# Patient Record
Sex: FEMALE | Race: BLACK OR AFRICAN AMERICAN | NOT HISPANIC OR LATINO
[De-identification: names, ages, dates, MRNs, and addresses within clinical notes are randomized per-mention and may not be internally consistent; named-entity substitution may affect disease eponyms.]

---

## 2021-10-29 ENCOUNTER — APPOINTMENT (OUTPATIENT)
Dept: DISASTER EMERGENCY | Facility: CLINIC | Age: 76
End: 2021-10-29

## 2021-10-29 LAB — SARS-COV-2 N GENE NPH QL NAA+PROBE: NOT DETECTED

## 2021-11-06 DIAGNOSIS — Z01.818 ENCOUNTER FOR OTHER PREPROCEDURAL EXAMINATION: ICD-10-CM

## 2021-11-12 ENCOUNTER — APPOINTMENT (OUTPATIENT)
Dept: DISASTER EMERGENCY | Facility: CLINIC | Age: 76
End: 2021-11-12

## 2021-11-13 LAB — SARS-COV-2 N GENE NPH QL NAA+PROBE: NOT DETECTED

## 2022-04-16 ENCOUNTER — LABORATORY RESULT (OUTPATIENT)
Age: 77
End: 2022-04-16

## 2022-04-20 ENCOUNTER — APPOINTMENT (OUTPATIENT)
Dept: PAIN MANAGEMENT | Facility: CLINIC | Age: 77
End: 2022-04-20
Payer: MEDICARE

## 2022-04-20 PROCEDURE — 64494 INJ PARAVERT F JNT L/S 2 LEV: CPT | Mod: 59,LT

## 2022-04-20 PROCEDURE — 64493 INJ PARAVERT F JNT L/S 1 LEV: CPT | Mod: RT

## 2022-04-20 NOTE — PROCEDURE
[FreeTextEntry3] : Date of Service: 04/20/2022 \par \par Account: [id number]\par \par Patient: OLU FISHMAN \par \par YOB: 1945\par \par Age: 76 year\par \par \par Surgeon:  John Greene M.D.\par \par Assistant: None.\par \par Pre-Operative Diagnosis: Spondylosis of lumbar region without myelopathy or radiculopathy\par \par Post Operative Diagnosis:  Spondylosis of lumbar region without myelopathy or radiculopathy\par \par Procedure: Right L3,L4,L5 Medial Branch block \par                    Left L3,L4,L5  Medial Branch block under fluoroscopic guidance\par \par Anesthesia:      MAC\par \par This procedure was carried out using fluoroscopic guidance.  The risks and benefits of the procedure were discussed extensively with the patient.  The consent of the patient was obtained and the following procedure was performed.\par \par  The patient was placed in the prone position.  The patient's back was prepped and draped in a sterile fashion.  The left L4 and L5 lumbar vertebral bodies were identified and the fluoroscope left obliqued to approximately 30 degrees to reveal good "Charles-dog" anatomical view.  The junction of the superior articulate process and tranverse process at the L4 and L5 level was then identified and marked. The skin at these target points was then localized using 1 cc of 1% Lidocaine without epinephrine at each injection site.  A spinal needle was then introduced and advanced to the above target points at the junction of the SAP and transverse processes until oss was contacted.  After negative aspiration for heme and CSF, an injectate of 1cc 0.25% marcaine  was injected at each of the two levels. \par \par The right L4 and L5 lumbar vertebral bodies were identified and the fluoroscope right obliqued to approximately 30 degrees to reveal good "Charles-dog" anatomical view.  The junction of the superior articulate process and tranverse process at the L4 and L5 level was then identified and marked. The skin at these target points was then localized using 1 cc of 1% Lidocaine without epinephrine at each injection site.  A spinal needle was then introduced and advanced to the above target points at the junction of the SAP and transverse processes until oss was contacted.  After negative aspiration for heme and CSF, an injectate of 1cc 0.25% was injected at each of the two levels. \par \par  Fluoroscope then focused on the bilateral sacral ala on AP view, and marked at these points.  The skin and subcutaneous structures were localized using 1cc of 1.0 % lidocaine without epinephrine.  A spinal needle was then advanced under fluoroscopic guidance until oss was contacted at the ala bilaterally.  After negative aspiration for heme and CSF, an injectate of 1cc 0.25% marcaine was injected at each site.\par \par The needles were then removed and pressure was applied.  Anesthesia personnel were present throughout the procedure, monitoring vitals which were stable throughout.\par \par \par John Greene M.D.\par

## 2022-04-26 ENCOUNTER — APPOINTMENT (OUTPATIENT)
Dept: ORTHOPEDIC SURGERY | Facility: CLINIC | Age: 77
End: 2022-04-26
Payer: MEDICARE

## 2022-04-26 VITALS — HEIGHT: 63 IN | WEIGHT: 160 LBS | BODY MASS INDEX: 28.35 KG/M2

## 2022-04-26 DIAGNOSIS — I10 ESSENTIAL (PRIMARY) HYPERTENSION: ICD-10-CM

## 2022-04-26 PROCEDURE — 99214 OFFICE O/P EST MOD 30 MIN: CPT

## 2022-04-26 NOTE — HISTORY OF PRESENT ILLNESS
[7] : 7 [Dull/Aching] : dull/aching [Localized] : localized [Lying in bed] : lying in bed [Retired] : Work status: retired [de-identified] : 77 y/o female with bilateral knee pain left > right. Seen in urgent care with MRI left knee. and lumbar spine. Was seen by Dr treadwell for back. Here to review MRI knee [] : Post Surgical Visit: no [FreeTextEntry1] : left knee  [FreeTextEntry3] : 3/10/22 [FreeTextEntry5] : patient is here with knee pain since 3/10/22\par patient is here for MRI results

## 2022-04-26 NOTE — ASSESSMENT
[FreeTextEntry1] :  76 year F WITH MODERATE RT/LT KNEE PAIN FOR MANY YEARS. SHE HAS TRIED GEL SHOTS WITH MINIMAL RELIEF. PAIN WORSENS WITH STAIRS AND WALKING PROLONGED DISTANCES. MRI REVIEWED. PAIN IS AFFECTING ADL AND FUNCTIONAL ACTIVITIES. TREATMENT OPTIONS REVIEWED. SHE WILL CHECK A1C BEFORE CSI IS CONSIDERED. IF A1C IS BELOW 7 SHE CAN RETURN FOR CSI AND LT KNEE XRAY. WILL AUTH FOR LT KNEE EUFLEXXA. \par \par \par PMHX: DM A1C: ?

## 2022-04-28 ENCOUNTER — APPOINTMENT (OUTPATIENT)
Dept: ORTHOPEDIC SURGERY | Facility: CLINIC | Age: 77
End: 2022-04-28
Payer: MEDICARE

## 2022-04-28 VITALS — HEIGHT: 63 IN | WEIGHT: 160 LBS | BODY MASS INDEX: 28.35 KG/M2

## 2022-04-28 DIAGNOSIS — M24.151 OTHER ARTICULAR CARTILAGE DISORDERS, RIGHT HIP: ICD-10-CM

## 2022-04-28 DIAGNOSIS — S83.282D OTHER TEAR OF LATERAL MENISCUS, CURRENT INJURY, LEFT KNEE, SUBSEQUENT ENCOUNTER: ICD-10-CM

## 2022-04-28 DIAGNOSIS — M16.11 UNILATERAL PRIMARY OSTEOARTHRITIS, RIGHT HIP: ICD-10-CM

## 2022-04-28 DIAGNOSIS — S83.232D COMPLEX TEAR OF MEDIAL MENISCUS, CURRENT INJURY, LEFT KNEE, SUBSEQUENT ENCOUNTER: ICD-10-CM

## 2022-04-28 PROCEDURE — 99214 OFFICE O/P EST MOD 30 MIN: CPT | Mod: 25

## 2022-04-28 PROCEDURE — 20610 DRAIN/INJ JOINT/BURSA W/O US: CPT

## 2022-04-28 RX ORDER — METFORMIN HYDROCHLORIDE 1000 MG/1
1000 TABLET, COATED ORAL
Refills: 0 | Status: ACTIVE | COMMUNITY

## 2022-04-28 RX ORDER — LISINOPRIL 40 MG/1
40 TABLET ORAL
Refills: 0 | Status: ACTIVE | COMMUNITY

## 2022-04-28 NOTE — PHYSICAL EXAM
[Extension] : extension [Bending to left] : bending to left [Right] : right hip [Left] : left knee [NL (0)] : extension 0 degrees [5___] : hamstring 5[unfilled]/5 [] : no erythema [TWNoteComboBox7] : flexion 120 degrees

## 2022-04-28 NOTE — PROCEDURE
[FreeTextEntry3] : Large Joint Injection was performed because of pain and inflammation. Anesthesia: ethyl chloride sprayed topically.. \par Decadron: An injection of Decadron 4 mg , \par Kenalog: An injection of Kenalog 5 mg , \par Lidocaine: 2 cc. \par \par Medication was injected in the left knee. Patient has tried OTC's including aspirin, Ibuprofen, Aleve etc or prescription NSAIDS, and/or exercises at home and/ or physical therapy without satisfactory response and Patient has decreased mobility in the joint. After verbal consent using sterile preparation and technique. The risks, benefits, and alternatives to cortisone injection were explained in full to the patient. Risks outlined include but are not limited to infection, sepsis, bleeding, scarring, skin discoloration, temporary increase in pain, syncopal episode, failure to resolve symptoms, allergic reaction, symptom recurrence, and elevation of blood sugar in diabetics. Patient understood the risks. All questions were answered. After discussion of options, patient requested an injection. Oral informed consent was obtained and sterile prep was done of the injection site. Sterile technique was utilized for the procedure including the preparation of the solutions used for the injection. Patient tolerated the procedure well. Advised to ice the injection site this evening. Prep with betadine locally to site. Sterile technique used and Prep with alcohol locally to site. Sterile technique used. Patient tolerated procedure well. Post Procedure Instructions: Patient was advised to call if redness, pain, or fever occur and apply ice for 15 min. out of every hour for the next 12-24 hours as tolerated. patient was advised to rest the joint(s) for 1-2 days.\par \par

## 2022-04-28 NOTE — DATA REVIEWED
[FreeTextEntry1] : MRI of the lumbar spine was reviewed and independently interpreted by me:\par No interval progression when compared to prior examination.\par L3-4 left foraminal herniation abutting the left exiting L3 nerve root. Moderate left neural foraminal stenosis.\par L4-5 grade 1 anterolisthesis, disc bulge and facet hypertrophy resulting in mild central canal, moderate bilateral neural foraminal stenosis, stable.\par L5-S1 central disc herniation with cranial migration. No thecal sac impingement. Facet hypertrophy contributes to moderate bilateral neural foraminal stenosis.\par \par MRI of the left knee was reviewed and independently interpreted by me:\par 1.  Complex tear of the posterior horn extending the posterior horn/body junction of the medial meniscus with associated parameniscal cysts, progressed compared to prior.\par 2.  Horizontal tear of the body of the lateral meniscus, progressed compared to prior.\par 3.  Low-grade sprain of the medial collateral ligament.\par 4.  Mild to moderate medial and mild patellofemoral osteoarthrosis, similar compared to prior.\par 5.  Multilobulated large popliteal cyst, slightly increased in size compared to prior.\par 6.  Mild distal quadriceps tendinosis.\par \par MRI of the right hip was reviewed and independently interpreted by me:\par 1.  Mild right hip osteoarthrosis. Small joint effusion.\par 2.  Degenerative tear of the superior labrum extending to the anterior labrum.\par 3.  4.5 cm subcutaneous lesion the left pubic region, incompletely evaluated, but may represent a lipoma; recommend clinical correlation and targeted ultrasound and/or MRI pelvic wall for further evaluation.\par 4.  Fibroid uterus.\par

## 2022-04-28 NOTE — DISCUSSION/SUMMARY
[de-identified] : MRIs reviewed with patient.\par Questions answered.\par \par The risks, benefits, and alternatives to corticosteroid injection were reviewed with the patient. Risks outlined include, but are not limited to infection, sepsis, bleeding, scarring, skin discoloration, temporary increase in pain, syncopal episode, failure to resolve symptoms, symptoms recurrence, allergic reaction, flare reaction, and elevation of blood sugar in diabetics. Patient understood the risks and asked to proceed with this treatment course.\par   HNP advised [place] and epidurals  and to see Spine surgeon.  Knee to consider visco and cortisone today. extended discussion that knee arthroscopy miight help. cortisone today . assures us that her BS is less than 150. explained if higher she may need ER for increased Blood sugar. Hip advised [place] and cortisone injection,. all explained

## 2022-04-28 NOTE — HISTORY OF PRESENT ILLNESS
[7] : 7 [Constant] : constant [] : yes [de-identified] : Follow-up today. Symptoms continue. She saw Dr. Rebolledo, who recommended CSI (pending patient's recent HgA1c) and visco. Also had follow up with Dr. Greene, who recommended facet blocks. Here to review multiple MRIs. Has seen Amaury who recc a hip injections. Knee was advised euflexa. back was advised Jc  and epidurals [FreeTextEntry1] : left knee [de-identified] : MRI @ [facility]

## 2022-04-28 NOTE — REASON FOR VISIT
[FreeTextEntry2] : Patient is here for a Follow Up to review MRI results for the left knee.. pain left knee lower back and hip persists.

## 2022-04-29 ENCOUNTER — APPOINTMENT (OUTPATIENT)
Dept: ORTHOPEDIC SURGERY | Facility: CLINIC | Age: 77
End: 2022-04-29

## 2022-05-26 ENCOUNTER — APPOINTMENT (OUTPATIENT)
Dept: PAIN MANAGEMENT | Facility: CLINIC | Age: 77
End: 2022-05-26

## 2022-06-16 ENCOUNTER — APPOINTMENT (OUTPATIENT)
Dept: PAIN MANAGEMENT | Facility: CLINIC | Age: 77
End: 2022-06-16

## 2022-06-16 NOTE — REASON FOR VISIT
[FreeTextEntry2] : f/u to injection\par Procedure: Right L3,L4,L5 medial branch block and Left L3,L4,L5 medial branch block under fluoroscopic guidance \par Anesthesia:MAC(DOS: 04/20/2022)

## 2022-07-06 ENCOUNTER — APPOINTMENT (OUTPATIENT)
Dept: ORTHOPEDIC SURGERY | Facility: CLINIC | Age: 77
End: 2022-07-06

## 2022-07-06 VITALS — BODY MASS INDEX: 28.35 KG/M2 | WEIGHT: 160 LBS | HEIGHT: 63 IN

## 2022-07-06 DIAGNOSIS — M89.8X8 OTHER SPECIFIED DISORDERS OF BONE, OTHER SITE: ICD-10-CM

## 2022-07-06 DIAGNOSIS — M76.899 OTHER SPECIFIED ENTHESOPATHIES OF UNSPECIFIED LOWER LIMB, EXCLUDING FOOT: ICD-10-CM

## 2022-07-06 DIAGNOSIS — M70.62 TROCHANTERIC BURSITIS, LEFT HIP: ICD-10-CM

## 2022-07-06 DIAGNOSIS — M25.552 PAIN IN LEFT HIP: ICD-10-CM

## 2022-07-06 PROCEDURE — 99214 OFFICE O/P EST MOD 30 MIN: CPT

## 2022-07-06 PROCEDURE — 73502 X-RAY EXAM HIP UNI 2-3 VIEWS: CPT | Mod: LT

## 2022-07-06 NOTE — DISCUSSION/SUMMARY
[de-identified] : Progress note completed by BETTY Lancaster.\par \par Physician Instructions:\par The documentation recorded by the PA accurately reflects the service I personally performed and decisions made by me.\par \par discussed may need mri and to start PT if not improving in several weels\par \par Patient instructed to apply ice to the area of the injection for 10 to 15 minutes 3 x a day if has symptoms\par \par

## 2022-07-06 NOTE — ASSESSMENT
[FreeTextEntry1] : The patient was advised of the diagnosis. The natural history of the pathology was explained in full to the patient in layman's terms. All questions were answered. The risks and benefits of surgical and non-surgical treatment alternatives were explained in full to the patient.\par \par \par

## 2022-07-06 NOTE — PHYSICAL EXAM
[Extension] : extension [Bending to left] : bending to left [Left] : left knee [NL (0)] : extension 0 degrees [5___] : hamstring 5[unfilled]/5 [] : no erythema [TWNoteComboBox7] : flexion 120 degrees

## 2022-07-06 NOTE — IMAGING
[Left] : left hip with pelvis [AP] : anteroposterior [Lateral] : lateral [There are no fractures, subluxations or dislocations. No significant abnormalities are seen] : There are no fractures, subluxations or dislocations. No significant abnormalities are seen [FreeTextEntry9] : noted fibroids

## 2022-07-06 NOTE — HISTORY OF PRESENT ILLNESS
[Gradual] : gradual [Result of repetitive motion] : result of repetitive motion [8] : 8 [0] : 0 [de-identified] : Patient presents with left hip pain since 5/2022. No specific injury, but had been on her feet for two days. Describes lateral hip pain without radiation. ankle swelling from salt intake [] : no [FreeTextEntry1] : both feet [FreeTextEntry5] : No known injury, pain started occurring gradually

## 2022-07-14 ENCOUNTER — APPOINTMENT (OUTPATIENT)
Dept: PAIN MANAGEMENT | Facility: CLINIC | Age: 77
End: 2022-07-14

## 2022-08-04 ENCOUNTER — APPOINTMENT (OUTPATIENT)
Dept: PAIN MANAGEMENT | Facility: CLINIC | Age: 77
End: 2022-08-04

## 2022-08-04 VITALS — HEIGHT: 63 IN | WEIGHT: 160 LBS | BODY MASS INDEX: 28.35 KG/M2

## 2022-08-04 PROCEDURE — 99214 OFFICE O/P EST MOD 30 MIN: CPT

## 2022-08-04 NOTE — HISTORY OF PRESENT ILLNESS
[Lower back] : lower back [3] : 3 [2] : 2 [Dull/Aching] : dull/aching [Intermittent] : intermittent [Household chores] : household chores [Leisure] : leisure [Injection therapy] : injection therapy [Standing] : standing [Walking] : walking [] : no

## 2022-08-04 NOTE — REASON FOR VISIT
[FreeTextEntry2] : f/u to injection \par Procedure: Right L3,L4,L5 medial branch block and Left L3,L4,L5 medial branch block under fluoroscopic guidance \par Anesthesia:MAC(04/20/2022)

## 2022-08-04 NOTE — PHYSICAL EXAM
[de-identified] : PHYSICAL EXAM\par \par Constitutional: \par Appears well, no apparent distress\par Ability to communicate: Normal\par Respiratory: non-labored breathing\par Skin: no rash noted\par Head: normocephalic, atraumatic\par Neck: no visible thyroid enlargement\par Eyes: extraocular movements intact\par Neurologic: alert and oriented x3\par Psychiatric: normal mood, affect, and behavior\par \par Lumbar Spine: \par Palpation: left and right lumbar paraspinal spasm and left and right lumbar paraspinal tenderness to palpation.\par ROM: Diminished range of motion in all plains.  Patient notes pain with lateral bending to the left and right.\par MMT: Motor exam is 5/5 through out bilateral lower extremities.\par Sensation: Light touch and pain is intact throughout bilateral lower extremities.\par Reflexes: achilles and patella reflexes are intact and  symmetrical.  No sustained clonus.\par Special Testing: Positive kemps maneuver on the left and right side\par \par Assessemnt:\par Lumbar spondylosis (m47.816)\par Myalgia (M79.10)\par \par Plan:\par After discussing various treatment options with the patient including but not limited to oral medications, physical therapy, exercise modalities as well as interventional spinal injections, we have decided with the following plan:\par The patient is presenting with axial lumbar pain that has not responded to three months of conservative therapy including physical therapy or nsaid therapy. The pain is interfering with activities of daily living and functionality.  There is no radicular pain.  The pain is exacerbated by facet loading.  Positive kemps maneuver which is defined by pain reproduction with extension and rotation of the lumbar spine to the affected side.  The patient has not had a vertebral fusion at the levels of the proposed treatment.  There is no unexplained neurologic deficit.  There is no bleeding tendency, unstable medical condition, or systemic infection.  The injection is being performed to diagnose the facet joint as the source of the individuals pain.\par \par The risks, benefits and alternatives of the proposed procedure were explained in detail with the patient.  The risks outlined include but are not limited to infection, bleeding, post dural puncture headache, nerve injury, a temporary increase in pain, failure to resolve symptoms, allergic reaction, symptom recurrence, and possible elevation of blood sugar.  All questions were answered to patient's satisfaction and he/she verbalized an understanding.\par \par Follow up 1-2 weeks post injection foe re-evaluation.\par \par Continue home exercises, stretching, activity modification, physical therapy, and conservative care.\par \par \par \par

## 2022-10-19 ENCOUNTER — APPOINTMENT (OUTPATIENT)
Dept: ORTHOPEDIC SURGERY | Facility: CLINIC | Age: 77
End: 2022-10-19

## 2022-10-19 VITALS — WEIGHT: 160 LBS | BODY MASS INDEX: 28.35 KG/M2 | HEIGHT: 63 IN

## 2022-10-19 DIAGNOSIS — M51.26 OTHER INTERVERTEBRAL DISC DISPLACEMENT, LUMBAR REGION: ICD-10-CM

## 2022-10-19 DIAGNOSIS — M54.16 RADICULOPATHY, LUMBAR REGION: ICD-10-CM

## 2022-10-19 PROCEDURE — 73565 X-RAY EXAM OF KNEES: CPT

## 2022-10-19 PROCEDURE — 99213 OFFICE O/P EST LOW 20 MIN: CPT | Mod: 25

## 2022-10-19 PROCEDURE — 73560 X-RAY EXAM OF KNEE 1 OR 2: CPT | Mod: LT

## 2022-10-19 PROCEDURE — 20610 DRAIN/INJ JOINT/BURSA W/O US: CPT

## 2022-10-19 NOTE — IMAGING
[Left] : left knee [Lateral] : lateral [[place]] : [place] [Mild patellofemoral OA] : Mild patellofemoral OA [AP Standing] : anteroposterior standing [Moderate tricompartmental OA medial narrowing] : Moderate tricompartmental OA medial narrowing

## 2022-10-26 NOTE — REASON FOR VISIT
[FreeTextEntry2] : Patient is here for a Follow Up appointment for the Left Knee and the Lower Back.

## 2022-10-26 NOTE — HISTORY OF PRESENT ILLNESS
[Lower back] : lower back [7] : 7 [6] : 6 [de-identified] : pain back and left knee. Left knee sore and synvisc 2021 helpped some but more relief from injection of euflexa 2020. No new injuries Back is sore and is having PT that is helpping\par \par MRI march 2022 show HNP and arthritis lumbar spine and foraminal stenosis [FreeTextEntry1] : left knee

## 2022-10-26 NOTE — PROCEDURE
[FreeTextEntry3] : Large Joint Injection was performed in the left knee because of pain and inflammation. \par Decadron: An injection of Decadron 4 mg , \par Kenalog: An injection of Kenalog 5 mg , \par Lidocaine: 2 cc. \par \par Patient has tried OTC's including aspirin, Ibuprofen, Aleve etc or prescription NSAIDS, and/or exercises at home and/ or physical therapy without satisfactory response and Patient has decreased mobility in the joint. The risks, benefits, and alternatives to cortisone injection were explained in full to the patient. Risks outlined include but are not limited to infection, sepsis, bleeding, scarring, skin discoloration, temporary increase in pain, syncopal episode, failure to resolve symptoms, allergic reaction, symptom recurrence, and elevation of blood sugar in diabetics. Patient understood the risks. All questions were answered. After discussion of options, patient requested an injection. Oral informed consent was obtained. Sterile technique was utilized for the procedure including the preparation of the solutions used for the injection. Injection site was prepped with betadine and alcohol. Ethyl chloride sprayed topically. Sterile technique used. Patient tolerated procedure well. \par \par Post Procedure Instructions: Patient was advised to call if redness, pain, or fever occur. Apply ice for 15 min. every 2 hours for the next 12-24 hours as tolerated. Patient was advised to rest the joint(s) for 1-2 days.\par \par \par

## 2022-10-26 NOTE — DISCUSSION/SUMMARY
[de-identified] : The patient was advised of the diagnosis. The natural history of the pathology was explained in full to the patient in layman's terms. All questions were answered. The risks and benefits of surgical and non-surgical treatment alternatives were explained in full to the patient.\par \par Request suth euflexa X3 left knee. \par \par To continue PT for back and knee. \par \par Total knee in future discussed , also discussed cortisone today

## 2022-11-16 ENCOUNTER — APPOINTMENT (OUTPATIENT)
Dept: PAIN MANAGEMENT | Facility: CLINIC | Age: 77
End: 2022-11-16
Payer: MEDICARE

## 2022-11-16 PROCEDURE — 64494 INJ PARAVERT F JNT L/S 2 LEV: CPT | Mod: 50

## 2022-11-16 PROCEDURE — 64493 INJ PARAVERT F JNT L/S 1 LEV: CPT | Mod: 50

## 2022-11-16 NOTE — PROCEDURE
[FreeTextEntry3] : Date of Service: 11/16/2022 \par \par Account: [id number]\par \par Patient: OLU FISHMAN \par \par YOB: 1945\par \par Age: 77 year\par \par \par Surgeon:  John Greene M.D.\par \par Assistant: None.\par \par Pre-Operative Diagnosis: Spondylosis of lumbar region without myelopathy or radiculopathy\par \par Post Operative Diagnosis:  Spondylosis of lumbar region without myelopathy or radiculopathy\par \par Procedure: Right L3,L4,L5 Medial Branch block \par                    Left L3,L4,L5  Medial Branch block under fluoroscopic guidance\par \par Anesthesia:      MAC\par \par This procedure was carried out using fluoroscopic guidance.  The risks and benefits of the procedure were discussed extensively with the patient.  The consent of the patient was obtained and the following procedure was performed.\par \par  The patient was placed in the prone position.  The patient's back was prepped and draped in a sterile fashion.  The left L4 and L5 lumbar vertebral bodies were identified and the fluoroscope left obliqued to approximately 30 degrees to reveal good "Charles-dog" anatomical view.  The junction of the superior articulate process and tranverse process at the L4 and L5 level was then identified and marked. The skin at these target points was then localized using 1 cc of 1% Lidocaine without epinephrine at each injection site.  A spinal needle was then introduced and advanced to the above target points at the junction of the SAP and transverse processes until oss was contacted.  After negative aspiration for heme and CSF, an injectate of 1cc 0.25% marcaine  was injected at each of the two levels. \par \par The right L4 and L5 lumbar vertebral bodies were identified and the fluoroscope right obliqued to approximately 30 degrees to reveal good "Charles-dog" anatomical view.  The junction of the superior articulate process and tranverse process at the L4 and L5 level was then identified and marked. The skin at these target points was then localized using 1 cc of 1% Lidocaine without epinephrine at each injection site.  A spinal needle was then introduced and advanced to the above target points at the junction of the SAP and transverse processes until oss was contacted.  After negative aspiration for heme and CSF, an injectate of 1cc 0.25% was injected at each of the two levels. \par \par  Fluoroscope then focused on the bilateral sacral ala on AP view, and marked at these points.  The skin and subcutaneous structures were localized using 1cc of 1.0 % lidocaine without epinephrine.  A spinal needle was then advanced under fluoroscopic guidance until oss was contacted at the ala bilaterally.  After negative aspiration for heme and CSF, an injectate of 1cc 0.25% marcaine was injected at each site.\par \par The needles were then removed and pressure was applied.  Anesthesia personnel were present throughout the procedure, monitoring vitals which were stable throughout.\par \par \par John Greene M.D.\par

## 2022-12-08 ENCOUNTER — APPOINTMENT (OUTPATIENT)
Dept: PAIN MANAGEMENT | Facility: CLINIC | Age: 77
End: 2022-12-08

## 2022-12-08 VITALS — BODY MASS INDEX: 28.35 KG/M2 | HEIGHT: 63 IN | WEIGHT: 160 LBS

## 2022-12-08 DIAGNOSIS — M47.817 SPONDYLOSIS W/OUT MYELOPATHY OR RADICULOPATHY, LUMBOSACRAL REGION: ICD-10-CM

## 2022-12-08 PROCEDURE — 99213 OFFICE O/P EST LOW 20 MIN: CPT

## 2022-12-08 NOTE — PHYSICAL EXAM
[de-identified] : PHYSICAL EXAM\par \par Constitutional: \par Appears well, no apparent distress\par Ability to communicate: Normal\par Respiratory: non-labored breathing\par Skin: no rash noted\par Head: normocephalic, atraumatic\par Neck: no visible thyroid enlargement\par Eyes: extraocular movements intact\par Neurologic: alert and oriented x3\par Psychiatric: normal mood, affect, and behavior\par \par \par Back, including spine: Range of motion of the thoracic and lumbar spine is as follows: Diminished range of motion in all planes.  MMT 5/5 BL LE.  Sensation is intact to light touch and pin prick BL LE.  Negative Straight leg raise testing bilaterally.  Negatvie Kemps maneuver bilaterally.  Normal Gait.\par \par \par Assessment:\par Lumbago\par \par Plan:\par After discussing various treatment options with the patient including but not limited to oral medications, physical therapy, exercise modalities as well as interventional spinal injections, we have decided with the following plan:\par  \par Continue home exercises, stretching, activity modification, physical therapy, and conservative care.\par \par \par

## 2022-12-08 NOTE — HISTORY OF PRESENT ILLNESS
[Lower back] : lower back [0] : 0 [Occasional] : occasional [Injection therapy] : injection therapy [de-identified] : pt is following up after b/l mbb she states she is feeling good and is not having pain at the moment  [] : no

## 2023-01-14 ENCOUNTER — APPOINTMENT (OUTPATIENT)
Dept: ORTHOPEDIC SURGERY | Facility: CLINIC | Age: 78
End: 2023-01-14
Payer: MEDICARE

## 2023-01-14 VITALS — BODY MASS INDEX: 28.35 KG/M2 | WEIGHT: 160 LBS | HEIGHT: 63 IN

## 2023-01-14 PROCEDURE — 72040 X-RAY EXAM NECK SPINE 2-3 VW: CPT

## 2023-01-14 PROCEDURE — 99213 OFFICE O/P EST LOW 20 MIN: CPT

## 2023-01-14 RX ORDER — CYCLOBENZAPRINE HYDROCHLORIDE 5 MG/1
5 TABLET, FILM COATED ORAL
Qty: 30 | Refills: 0 | Status: ACTIVE | COMMUNITY
Start: 2023-01-14 | End: 1900-01-01

## 2023-01-14 RX ORDER — METHYLPREDNISOLONE 4 MG/1
4 TABLET ORAL
Qty: 1 | Refills: 0 | Status: ACTIVE | COMMUNITY
Start: 2023-01-14 | End: 1900-01-01

## 2023-01-14 NOTE — PHYSICAL EXAM
[Flexion] : flexion [Extension] : extension [Rotation to right] : rotation to right [] : negative [place] [TWNoteComboBox7] : forward flexion 30 degrees [de-identified] : extension 20 degrees [de-identified] : left lateral flexion 20 degrees [de-identified] : left lateral rotation 60 degrees [de-identified] : right lateral flexion 10 degrees [TWNoteComboBox6] : right lateral rotation 45 degrees

## 2023-01-14 NOTE — DISCUSSION/SUMMARY
[de-identified] : Ms. OLU FISHMAN is a 77 year year old female  diagnosed with cervical radiculopathy. Diagnosis was discussed and treatment options were reviewed. She was prescribed a course of physical therapy. She was advised to avoid any heavy lifting or other exacerbating factors. \par \par A prescription for a MDP and muscle relaxers were prescribed for the patient. She was advised to take the pack in it's entirety and not take any other anti inflammatory medications while on the steroids. She  is aware the medication may cause stomach upset and was advised to take the medication with food.  The muscle relaxers can be sedating, so they were advised driving while on these medications.\par \par If she develops any symptoms of bowel or bladder incontinence, saddle anesthesia, new onset weakness in the legs or trouble walking, she will go to the ED immediately. If her symptoms persist with conservative care, she should follow up with a spine specialist. \par \par All of her questions were answered. She understands and agrees. \par \par

## 2023-01-14 NOTE — HISTORY OF PRESENT ILLNESS
[Sudden] : sudden [10] : 10 [8] : 8 [Sharp] : sharp [Shooting] : shooting [Constant] : constant [Rest] : rest [Retired] : Work status: retired [de-identified] : This is a 77 year old female with complaints of neck and left arm pain. She thinks this all started after taking on/off a girdle that was too tight on 1/8/22. She states the next morning she woke up with severe pain in her neck which radiates down the arm into the the fourth and fifth fingers. she denies N/T. she states her arm pain feels better when she keeps her hand on her head. she had not tried nsaids. she was in PT this week for another issue and they suggested she come today to be evaluated for her neck.  [] : no [FreeTextEntry2] : left arm / neck  [FreeTextEntry5] : patient presents with neck and left arm since 01/13/23 patient went to physical therapy and they said it might be c6 nerve root  [de-identified] : movement

## 2023-01-14 NOTE — IMAGING
[de-identified] : AP and lateral views of the cervical spine with normal alignment. There are no acute fractures or dislocations. There is straightening of the normal cervical lordosis. Severe degenerative changes throughout the cervical spine more severe at C4/5, C5/6.

## 2023-02-02 ENCOUNTER — APPOINTMENT (OUTPATIENT)
Dept: PAIN MANAGEMENT | Facility: CLINIC | Age: 78
End: 2023-02-02
Payer: MEDICARE

## 2023-02-02 VITALS — HEIGHT: 62 IN | WEIGHT: 160 LBS | BODY MASS INDEX: 29.44 KG/M2

## 2023-02-02 DIAGNOSIS — M54.12 RADICULOPATHY, CERVICAL REGION: ICD-10-CM

## 2023-02-02 PROCEDURE — 99212 OFFICE O/P EST SF 10 MIN: CPT

## 2023-02-02 NOTE — DISCUSSION/SUMMARY
[de-identified] : new left sided radicular pain with paresthesias in digits 4 and 5\par will MRI c spine for evaluation\par no relief with nsaids or PT

## 2023-02-02 NOTE — PHYSICAL EXAM
[de-identified] : PHYSICAL EXAM\par \par Constitutional: \par Appears well, no apparent distress\par Ability to communicate: Normal\par Respiratory: non-labored breathing\par Skin: no rash noted\par Head: normocephalic, atraumatic\par Neck: no visible thyroid enlargement\par Eyes: extraocular movements intact\par Neurologic: alert and oriented x3\par Psychiatric: normal mood, affect, and behavior\par \par Cervical:\par Palpation: left trapezial spasm, left trapezius tenderness and left paracervical tenderness\par ROM: Diminished range of motion in all plains.  Patient notes pain at extremes of extension and rotation to left.  Stiffness at extremes of extension and rotation to the left\par MMT: Motor exam is 5/5 through out bilateral upper extremities.\par Sensation: Light touch and pain is intact throughout bilateral upper extremities.\par Reflexes: No sustained clonus.\par Special Testing: Positive left Spurling test \par \par

## 2023-02-02 NOTE — HISTORY OF PRESENT ILLNESS
[Neck] : neck [8] : 8 [Dull/Aching] : dull/aching [Radiating] : radiating [] : yes [Constant] : constant [Household chores] : household chores [Leisure] : leisure [Sleep] : sleep [Rest] : rest [Walking] : walking [Bending forward] : bending forward [FreeTextEntry7] : LEFT ARM, fingers

## 2023-02-16 ENCOUNTER — APPOINTMENT (OUTPATIENT)
Dept: ORTHOPEDIC SURGERY | Facility: CLINIC | Age: 78
End: 2023-02-16
Payer: MEDICARE

## 2023-02-16 VITALS — WEIGHT: 160 LBS | BODY MASS INDEX: 29.44 KG/M2 | HEIGHT: 62 IN

## 2023-02-16 DIAGNOSIS — E10.29 TYPE 1 DIABETES MELLITUS WITH OTHER DIABETIC KIDNEY COMPLICATION: ICD-10-CM

## 2023-02-16 DIAGNOSIS — E11.9 TYPE 2 DIABETES MELLITUS W/OUT COMPLICATIONS: ICD-10-CM

## 2023-02-16 PROCEDURE — 20610 DRAIN/INJ JOINT/BURSA W/O US: CPT | Mod: 50

## 2023-02-16 PROCEDURE — 99214 OFFICE O/P EST MOD 30 MIN: CPT | Mod: 25

## 2023-02-16 PROCEDURE — J3490M: CUSTOM

## 2023-02-16 PROCEDURE — 20611 DRAIN/INJ JOINT/BURSA W/US: CPT | Mod: NC

## 2023-02-16 NOTE — DISCUSSION/SUMMARY
[de-identified] : Patient allowed to gently start resuming activities.\par Discussed change to medication prescription and usage. \par Offered cortisone steroid injection. Low dose\par Bracing options discussed with patient. \par Hyaluronic Acid inj pamphlet given to pt.\par 02/16/2023 \par RE:  OLU FISHMAN \par \par Acct #- [id number] \par Attention:  Nurse Reviewer /Medical Director\par euflessa both knees\par Patient has tried analgesics, non-steroid anti-inflammatory agents, \par physical therapy, hot or cold compresses,injections of corticosteroids, etc)  which in combination or by themselves has not worked.\par Based on my patient's condition, I strongly believe that the Hyaluronic aid injections is medically needed.\par  \par Thank you for your time and consideration.   \par \par

## 2023-02-16 NOTE — HISTORY OF PRESENT ILLNESS
[Constant] : constant [Sleep] : sleep [Meds] : meds [Lower back] : lower back [7] : 7 [6] : 6 [de-identified] : pain recurred in Jan had CSI in Oct by DR Horne and had synvisc in 2021 wih some help but did better with euflexxa, no injury, pain is medial [] : no [FreeTextEntry1] : left knee [FreeTextEntry5] : pt has been having ongoing bilateral knee for years, L > R. pt states she noticed some swelling in her ankles  [FreeTextEntry7] : down her legs to her ankles  [FreeTextEntry9] : tylenol  [de-identified] : movements [de-identified] : 10/2022 [de-identified] :   [de-identified] : XR

## 2023-02-16 NOTE — PHYSICAL EXAM
[Left] : left knee [NL (0)] : extension 0 degrees [5___] : hamstring 5[unfilled]/5 [] : no erythema [TWNoteComboBox7] : flexion 120 degrees

## 2023-02-16 NOTE — IMAGING
[Left] : left knee [Lateral] : lateral [Keiser] : skyline [Mild patellofemoral OA] : Mild patellofemoral OA [AP Standing] : anteroposterior standing [Moderate tricompartmental OA medial narrowing] : Moderate tricompartmental OA medial narrowing

## 2023-04-11 ENCOUNTER — APPOINTMENT (OUTPATIENT)
Dept: ORTHOPEDIC SURGERY | Facility: CLINIC | Age: 78
End: 2023-04-11
Payer: MEDICARE

## 2023-04-11 VITALS — HEIGHT: 62 IN | BODY MASS INDEX: 29.44 KG/M2 | WEIGHT: 160 LBS

## 2023-04-11 DIAGNOSIS — Z00.00 ENCOUNTER FOR GENERAL ADULT MEDICAL EXAMINATION W/OUT ABNORMAL FINDINGS: ICD-10-CM

## 2023-04-11 DIAGNOSIS — E11.9 TYPE 2 DIABETES MELLITUS W/OUT COMPLICATIONS: ICD-10-CM

## 2023-04-11 DIAGNOSIS — I89.0 LYMPHEDEMA, NOT ELSEWHERE CLASSIFIED: ICD-10-CM

## 2023-04-11 PROCEDURE — 73610 X-RAY EXAM OF ANKLE: CPT | Mod: 50

## 2023-04-11 PROCEDURE — 99213 OFFICE O/P EST LOW 20 MIN: CPT

## 2023-04-11 NOTE — PHYSICAL EXAM
[Bilateral] : foot and ankle bilaterally [Mild] : mild diffused ankle swelling [] : Sensation present to light touch in all distributions

## 2023-04-11 NOTE — HISTORY OF PRESENT ILLNESS
[de-identified] : 04/11/23: Pt is a 77 year old F presenting of b/l ankle pain. Referred by Dr. Plunkett after visit on 2/16/23. Pt is concerned about swelling around the ankles and calfs which started 2021.  PCP gave her water pills during 2022. Borderline diabetic. Going to PT for the knees in 2023. No prior injury or trauma. Some numbness and tingling around the foot. WB sneakers. \par \par DM on Metformin does not recall A1C [FreeTextEntry1] : b/l ankles

## 2023-04-26 ENCOUNTER — APPOINTMENT (OUTPATIENT)
Dept: ORTHOPEDIC SURGERY | Facility: CLINIC | Age: 78
End: 2023-04-26
Payer: MEDICARE

## 2023-04-26 VITALS — WEIGHT: 160 LBS | BODY MASS INDEX: 29.44 KG/M2 | HEIGHT: 62 IN

## 2023-04-26 PROCEDURE — 20611 DRAIN/INJ JOINT/BURSA W/US: CPT | Mod: LT

## 2023-04-26 PROCEDURE — 99214 OFFICE O/P EST MOD 30 MIN: CPT | Mod: 25

## 2023-04-26 NOTE — HISTORY OF PRESENT ILLNESS
[9] : 9 [8] : 8 [Constant] : constant [de-identified] : pain recurred , CSI with  temporary help and synvisc allowed, no injury [FreeTextEntry1] : left knee [de-identified] : physical therapy

## 2023-04-26 NOTE — DISCUSSION/SUMMARY
[de-identified] : modify activities\par use elastic sleeve\par try OTC meds\par ice as needed\par

## 2023-04-26 NOTE — IMAGING
[Left] : left knee [Lateral] : lateral [Berwick] : skyline [Mild patellofemoral OA] : Mild patellofemoral OA [AP Standing] : anteroposterior standing [Moderate tricompartmental OA medial narrowing] : Moderate tricompartmental OA medial narrowing

## 2023-04-26 NOTE — PHYSICAL EXAM
[Right] : right knee [Left] : left knee [NL (0)] : extension 0 degrees [5___] : hamstring 5[unfilled]/5 [] : mildly antalgic [TWNoteComboBox7] : flexion 120 degrees

## 2023-05-03 ENCOUNTER — APPOINTMENT (OUTPATIENT)
Dept: ORTHOPEDIC SURGERY | Facility: CLINIC | Age: 78
End: 2023-05-03
Payer: MEDICARE

## 2023-05-03 DIAGNOSIS — M17.11 UNILATERAL PRIMARY OSTEOARTHRITIS, RIGHT KNEE: ICD-10-CM

## 2023-05-03 PROCEDURE — 20611 DRAIN/INJ JOINT/BURSA W/US: CPT | Mod: LT

## 2023-05-03 NOTE — HISTORY OF PRESENT ILLNESS
[2] : 2 [Synvisc] : Synvisc [de-identified] : Synvisc #2 left knee.  [] : no [de-identified] : 04/26/2023 [de-identified] : left knee

## 2023-05-03 NOTE — DISCUSSION/SUMMARY
[de-identified] : modify activities\par use elastic sleeve\par try OTC meds\par ice as needed\par

## 2023-05-03 NOTE — PHYSICAL EXAM
[Right] : right knee [Left] : left knee [NL (0)] : extension 0 degrees [5___] : hamstring 5[unfilled]/5 [] : no erythema [TWNoteComboBox7] : flexion 120 degrees

## 2023-05-10 ENCOUNTER — APPOINTMENT (OUTPATIENT)
Dept: ORTHOPEDIC SURGERY | Facility: CLINIC | Age: 78
End: 2023-05-10
Payer: MEDICARE

## 2023-05-10 VITALS — WEIGHT: 160 LBS | BODY MASS INDEX: 29.44 KG/M2 | HEIGHT: 62 IN

## 2023-05-10 DIAGNOSIS — M17.12 UNILATERAL PRIMARY OSTEOARTHRITIS, LEFT KNEE: ICD-10-CM

## 2023-05-10 PROCEDURE — 20611 DRAIN/INJ JOINT/BURSA W/US: CPT | Mod: LT

## 2023-05-10 NOTE — HISTORY OF PRESENT ILLNESS
[3] : 3 [Synvisc] : Synvisc [de-identified] : Synvisc #3 left knee.  [] : no [de-identified] : 05/03/2023 [de-identified] : left knee

## 2023-05-10 NOTE — DISCUSSION/SUMMARY
[de-identified] : modify activities\par use elastic sleeve\par try OTC meds\par ice as needed\par \par frequency of visco and csi discussed\par return in 6 weeks as needed.

## 2023-05-23 ENCOUNTER — APPOINTMENT (OUTPATIENT)
Dept: ORTHOPEDIC SURGERY | Facility: CLINIC | Age: 78
End: 2023-05-23

## 2024-07-25 ENCOUNTER — APPOINTMENT (OUTPATIENT)
Dept: ORTHOPEDIC SURGERY | Facility: CLINIC | Age: 79
End: 2024-07-25

## 2024-07-25 VITALS — WEIGHT: 160 LBS | BODY MASS INDEX: 29.44 KG/M2 | HEIGHT: 62 IN

## 2024-07-25 DIAGNOSIS — S62.617A DISPLACED FRACTURE OF PROXIMAL PHALANX OF LEFT LITTLE FINGER, INITIAL ENCOUNTER FOR CLOSED FRACTURE: ICD-10-CM

## 2024-07-25 DIAGNOSIS — M25.642 STIFFNESS OF LEFT HAND, NOT ELSEWHERE CLASSIFIED: ICD-10-CM

## 2024-07-25 DIAGNOSIS — Z78.9 OTHER SPECIFIED HEALTH STATUS: ICD-10-CM

## 2024-07-25 PROCEDURE — 99214 OFFICE O/P EST MOD 30 MIN: CPT

## 2024-07-25 PROCEDURE — 73140 X-RAY EXAM OF FINGER(S): CPT | Mod: LT

## 2024-07-25 PROCEDURE — 99204 OFFICE O/P NEW MOD 45 MIN: CPT

## 2024-07-25 NOTE — IMAGING
[de-identified] : Left hand with little finger flexion with malrotation radially with overlap over the ring finger. Pt complaint of pain to the palmar region of the hand (A1 pulley with no triggering) There is no ligamentous laxity all digits are nvi with intact FDP, FDS and extensor tendon function Ipsilateral wrist with full and pain free ROM / no ttp.   Left little finger 3 view xray shows: P1 fracture with malrotation / significant callous formation noted.

## 2024-07-25 NOTE — HISTORY OF PRESENT ILLNESS
[Dull/Aching] : dull/aching [Localized] : localized [Intermittent] : intermittent [Retired] : Work status: retired [de-identified] : 07/25/2024 : OLU FISHMAN , a RHD 79 year old female, presents today for left pinky finger injured when she fell in June 9th went to [facility] MD had x-ray. Pt here with complaint of left small finger stiffness and contracture.   PMH: HTN, DM2, Hx of sternal osteomyelitis. Allergies: NKDA

## 2024-07-25 NOTE — ASSESSMENT
[FreeTextEntry1] : The patient was advised of the diagnosis. The natural history of the pathology was explained in full to the patient in layman's terms. All questions were answered. The risks and benefits of surgical and non-surgical treatment alternatives were explained in full to the patient.  Pt informed that after 6 weeks her left little finger P1 fracture has healed with a malunion. We have recommended non-surgical treatment and pt insists on surgical intervention, despite warning of possible failure, non-union, infection, continued/permanent stiffness.  We alkso discussed surgery- osteotomy, lengtehning, derotation, angulation correction- ideally she should have had surgery from the beginning, but at this point, it is much more complicated with greater risks including nonunion of the surgery and stiffness due to scarring- she already has stiffness and deformity and may benefit from some therapy for function only, but will always have deformity without corrective surgery.  She will consider her options and attempt therapy in the meantime    
Vital Signs: I have reviewed the initial vital signs.  Constitutional: well-nourished, appears stated age, no acute distress  Integumentary: + mild flat blanching erythematous rash noted to dorsum of both hands Left > Right;   no sweling/lymphangitis;  Heent: throat clear;  Lungs: no wheezing; clear to aus

## 2024-09-05 ENCOUNTER — APPOINTMENT (OUTPATIENT)
Dept: ORTHOPEDIC SURGERY | Facility: CLINIC | Age: 79
End: 2024-09-05

## 2025-05-22 ENCOUNTER — APPOINTMENT (OUTPATIENT)
Dept: PAIN MANAGEMENT | Facility: CLINIC | Age: 80
End: 2025-05-22

## 2025-05-22 VITALS — HEIGHT: 62 IN | WEIGHT: 170 LBS | BODY MASS INDEX: 31.28 KG/M2

## 2025-05-22 DIAGNOSIS — M47.817 SPONDYLOSIS W/OUT MYELOPATHY OR RADICULOPATHY, LUMBOSACRAL REGION: ICD-10-CM

## 2025-05-22 DIAGNOSIS — M79.18 MYALGIA, OTHER SITE: ICD-10-CM

## 2025-05-22 PROCEDURE — 99214 OFFICE O/P EST MOD 30 MIN: CPT | Mod: 25

## 2025-05-22 PROCEDURE — 20552 NJX 1/MLT TRIGGER POINT 1/2: CPT

## 2025-05-22 PROCEDURE — J3490M: CUSTOM | Mod: JZ

## 2025-06-18 ENCOUNTER — APPOINTMENT (OUTPATIENT)
Dept: PAIN MANAGEMENT | Facility: CLINIC | Age: 80
End: 2025-06-18
Payer: MEDICARE

## 2025-06-18 PROCEDURE — 64494 INJ PARAVERT F JNT L/S 2 LEV: CPT | Mod: 50,59

## 2025-06-18 PROCEDURE — 82948 REAGENT STRIP/BLOOD GLUCOSE: CPT

## 2025-06-18 PROCEDURE — 64493 INJ PARAVERT F JNT L/S 1 LEV: CPT | Mod: 50

## 2025-06-18 PROCEDURE — J3490M: CUSTOM

## 2025-07-02 ENCOUNTER — APPOINTMENT (OUTPATIENT)
Dept: PAIN MANAGEMENT | Facility: CLINIC | Age: 80
End: 2025-07-02

## 2025-07-03 ENCOUNTER — APPOINTMENT (OUTPATIENT)
Dept: PAIN MANAGEMENT | Facility: CLINIC | Age: 80
End: 2025-07-03

## 2025-07-10 ENCOUNTER — APPOINTMENT (OUTPATIENT)
Dept: PAIN MANAGEMENT | Facility: CLINIC | Age: 80
End: 2025-07-10

## 2025-07-10 VITALS — WEIGHT: 170 LBS | BODY MASS INDEX: 31.28 KG/M2 | HEIGHT: 62 IN

## 2025-07-10 PROCEDURE — 99213 OFFICE O/P EST LOW 20 MIN: CPT

## 2025-07-25 ENCOUNTER — APPOINTMENT (OUTPATIENT)
Dept: ORTHOPEDIC SURGERY | Facility: CLINIC | Age: 80
End: 2025-07-25
Payer: MEDICARE

## 2025-07-25 VITALS — BODY MASS INDEX: 31.28 KG/M2 | HEIGHT: 62 IN | WEIGHT: 170 LBS

## 2025-07-25 DIAGNOSIS — M17.11 UNILATERAL PRIMARY OSTEOARTHRITIS, RIGHT KNEE: ICD-10-CM

## 2025-07-25 DIAGNOSIS — M17.12 UNILATERAL PRIMARY OSTEOARTHRITIS, LEFT KNEE: ICD-10-CM

## 2025-07-25 PROCEDURE — 99213 OFFICE O/P EST LOW 20 MIN: CPT | Mod: 25

## 2025-07-25 PROCEDURE — 20610 DRAIN/INJ JOINT/BURSA W/O US: CPT | Mod: 50

## 2025-07-25 PROCEDURE — 73562 X-RAY EXAM OF KNEE 3: CPT | Mod: 50

## 2025-07-25 RX ORDER — ATORVASTATIN CALCIUM 80 MG/1
TABLET, FILM COATED ORAL
Refills: 0 | Status: ACTIVE | COMMUNITY

## 2025-07-25 RX ORDER — PIOGLITAZONE HYDROCHLORIDE 45 MG/1
TABLET ORAL
Refills: 0 | Status: ACTIVE | COMMUNITY

## 2025-08-07 ENCOUNTER — APPOINTMENT (OUTPATIENT)
Dept: PAIN MANAGEMENT | Facility: CLINIC | Age: 80
End: 2025-08-07
Payer: MEDICARE

## 2025-08-07 VITALS — BODY MASS INDEX: 31.28 KG/M2 | WEIGHT: 170 LBS | HEIGHT: 62 IN

## 2025-08-07 DIAGNOSIS — M51.26 OTHER INTERVERTEBRAL DISC DISPLACEMENT, LUMBAR REGION: ICD-10-CM

## 2025-08-07 PROCEDURE — 99213 OFFICE O/P EST LOW 20 MIN: CPT

## 2025-09-03 ENCOUNTER — APPOINTMENT (OUTPATIENT)
Dept: PAIN MANAGEMENT | Facility: CLINIC | Age: 80
End: 2025-09-03
Payer: MEDICARE

## 2025-09-03 DIAGNOSIS — M47.817 SPONDYLOSIS W/OUT MYELOPATHY OR RADICULOPATHY, LUMBOSACRAL REGION: ICD-10-CM

## 2025-09-03 PROCEDURE — 82948 REAGENT STRIP/BLOOD GLUCOSE: CPT

## 2025-09-03 PROCEDURE — 62323 NJX INTERLAMINAR LMBR/SAC: CPT

## 2025-09-11 ENCOUNTER — APPOINTMENT (OUTPATIENT)
Dept: ORTHOPEDIC SURGERY | Facility: CLINIC | Age: 80
End: 2025-09-11
Payer: MEDICARE

## 2025-09-11 DIAGNOSIS — M17.11 UNILATERAL PRIMARY OSTEOARTHRITIS, RIGHT KNEE: ICD-10-CM

## 2025-09-11 DIAGNOSIS — M17.12 UNILATERAL PRIMARY OSTEOARTHRITIS, LEFT KNEE: ICD-10-CM

## 2025-09-11 PROCEDURE — 99213 OFFICE O/P EST LOW 20 MIN: CPT

## 2025-09-18 ENCOUNTER — APPOINTMENT (OUTPATIENT)
Dept: PAIN MANAGEMENT | Facility: CLINIC | Age: 80
End: 2025-09-18